# Patient Record
Sex: MALE | Race: WHITE | NOT HISPANIC OR LATINO | Employment: FULL TIME | URBAN - METROPOLITAN AREA
[De-identification: names, ages, dates, MRNs, and addresses within clinical notes are randomized per-mention and may not be internally consistent; named-entity substitution may affect disease eponyms.]

---
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
## 2018-08-09 ENCOUNTER — HOSPITAL ENCOUNTER (EMERGENCY)
Facility: MEDICAL CENTER | Age: 18
End: 2018-08-09
Attending: EMERGENCY MEDICINE

## 2018-08-09 VITALS
BODY MASS INDEX: 17.97 KG/M2 | DIASTOLIC BLOOD PRESSURE: 71 MMHG | HEART RATE: 59 BPM | WEIGHT: 140 LBS | TEMPERATURE: 98.7 F | RESPIRATION RATE: 16 BRPM | SYSTOLIC BLOOD PRESSURE: 122 MMHG | OXYGEN SATURATION: 98 % | HEIGHT: 74 IN

## 2018-08-09 DIAGNOSIS — Z20.2 CHLAMYDIA CONTACT, TREATED: ICD-10-CM

## 2018-08-09 PROCEDURE — 700102 HCHG RX REV CODE 250 W/ 637 OVERRIDE(OP): Performed by: EMERGENCY MEDICINE

## 2018-08-09 PROCEDURE — A9270 NON-COVERED ITEM OR SERVICE: HCPCS | Performed by: EMERGENCY MEDICINE

## 2018-08-09 PROCEDURE — 99283 EMERGENCY DEPT VISIT LOW MDM: CPT

## 2018-08-09 RX ORDER — AZITHROMYCIN 250 MG/1
1000 TABLET, FILM COATED ORAL ONCE
Status: COMPLETED | OUTPATIENT
Start: 2018-08-09 | End: 2018-08-09

## 2018-08-09 RX ADMIN — AZITHROMYCIN 1000 MG: 250 TABLET, FILM COATED ORAL at 01:11

## 2018-08-09 NOTE — ED PROVIDER NOTES
"ED Provider Note    Scribed for Mercedez Hernandez M.D. by Yves Monroe. 8/9/2018, 12:33 AM.    Primary care provider: Pcp Pt States None  Means of arrival: Walk-in  History obtained from: Patient  History limited by: None    CHIEF COMPLAINT  Chief Complaint   Patient presents with   • Exposure to STD       HPI  Oz Coleman is a 18 y.o. male who presents to the Emergency Department with complaints of a possible STD. The patient presents with his girlfriend who was just diagnosed with chlamydia and was given a prescription of Azithromycin. He has had sex with her recently and he believes that he also has chlamydia. The patient is not experiencing any symptoms but he is aware that men are usually asymptomatic. Patient is not experiencing any testicular pain.     REVIEW OF SYSTEMS  Positives as above. Pertinent negatives including any testicular pain.     E.     PAST MEDICAL HISTORY   has a past medical history of Cardiac syncope.    SURGICAL HISTORY   has a past surgical history that includes other orthopedic surgery (2003).    SOCIAL HISTORY  Social History   Substance Use Topics   • Smoking status: Current Every Day Smoker     Packs/day: 0.50     Types: Cigarettes   • Smokeless tobacco: Never Used   • Alcohol use No      History   Drug Use No       FAMILY HISTORY  History reviewed. No pertinent family history.    CURRENT MEDICATIONS  Reviewed. See Encounter Summary.     ALLERGIES  Allergies   Allergen Reactions   • Fish Allergy      CRAWFISH   • Nkda [No Known Drug Allergy]    • Other Environmental      environment   • Other Food      Sea food   • Peanut-Derived        PHYSICAL EXAM  VITAL SIGNS: /71   Pulse (!) 59   Temp 37.1 °C (98.7 °F)   Resp 16   Ht 1.88 m (6' 2\")   Wt 63.5 kg (140 lb)   SpO2 98%   BMI 17.97 kg/m²    Pulse ox interpretation: I interpret this pulse ox as normal.  Constitutional: Alert in no apparent distress.  HENT: Normocephalic, Atraumatic, MMM  Eyes: PERR. Conjunctiva " normal, non-icteric.   Heart: Normal peripheral perfusion.    Lungs: Symmetrical expansion. No resp distress  Abdomen: Non-tender, non-distended, normal bowel sounds  Skin: Warm, Dry, No erythema, No rash.   Neurologic: Alert and oriented, Grossly non-focal.     DIFFERENTIAL DIAGNOSIS AND WORK UP PLAN    12:33 AM Patient seen and examined at bedside. The patient presents with exposure to STD and the differential diagnosis includes but is not limited to chlamydia -treated the patient's girlfriend this evening as well, the patient has no symptoms no testicular pain low concern for epididymitis at this time. Patient will be treated with 1000 mg Zithromax tablet     The patient will return for new or worsening symptoms and is stable at the time of discharge.    DISPOSITION:  Patient will be discharged home in stable condition.    FOLLOW UP:  [facility], Emergency Dept  [address] [place] [id number] [phone]    If symptoms worsen    FINAL IMPRESSION  1. Chlamydia contact, treated          IYves (Scribe), am scribing for, and in the presence of, Mercedez Hernandez M.D..    Electronically signed by: Yves Monroe (Scribe), 8/9/2018    IMercedez M.D. personally performed the services described in this documentation, as scribed by Yves Monroe in my presence, and it is both accurate and complete.    The note accurately reflects work and decisions made by me.  Mercedez Hernandez  8/9/2018  3:48 AM      This dictation has been created using voice recognition software and/or scribes. The accuracy of the dictation is limited by the abilities of the software and the expertise of the scribes. I expect there may be some errors of grammar and possibly content. I made every attempt to manually correct the errors within my dictation. However, errors related to voice recognition software and/or scribes may still exist and should be interpreted within the appropriate  context.

## 2018-10-15 ENCOUNTER — HOSPITAL ENCOUNTER (EMERGENCY)
Facility: MEDICAL CENTER | Age: 18
End: 2018-10-15
Attending: EMERGENCY MEDICINE

## 2018-10-15 VITALS
OXYGEN SATURATION: 100 % | SYSTOLIC BLOOD PRESSURE: 128 MMHG | TEMPERATURE: 98.6 F | RESPIRATION RATE: 14 BRPM | DIASTOLIC BLOOD PRESSURE: 72 MMHG | WEIGHT: 141.09 LBS | BODY MASS INDEX: 18.11 KG/M2 | HEIGHT: 74 IN | HEART RATE: 75 BPM

## 2018-10-15 DIAGNOSIS — R59.1 LYMPHADENOPATHY: Primary | ICD-10-CM

## 2018-10-15 PROCEDURE — 99281 EMR DPT VST MAYX REQ PHY/QHP: CPT

## 2018-10-15 ASSESSMENT — PAIN SCALES - GENERAL: PAINLEVEL_OUTOF10: 2

## 2018-10-15 NOTE — ED PROVIDER NOTES
"ED Provider Note    CHIEF COMPLAINT  Chief Complaint   Patient presents with   • Lump     had it for a while under left chin. woke up today w/pain and feels movable.       HPI  Oz Coleman is a 18 y.o. male who ambulates to the emergency department with significant other complaining of a \"lump under my chin.\"  Patient states he lump was palpable last week but became more tender overnight.  Sore throat a few days ago however this is resolved.  No nasal congestion or rhinorrhea.  No ear pain.  No headache, neck pain or stiffness.  No fever chills.    REVIEW OF SYSTEMS  See HPI for further details.     PAST MEDICAL HISTORY   has a past medical history of Cardiac syncope.    SOCIAL HISTORY  Social History     Social History Main Topics   • Smoking status: Current Every Day Smoker     Packs/day: 0.50     Types: Cigarettes   • Smokeless tobacco: Never Used   • Alcohol use No   • Drug use: No   • Sexual activity: Not on file       SURGICAL HISTORY   has a past surgical history that includes other orthopedic surgery (2003).    CURRENT MEDICATIONS  Home Medications    **Home medications have not yet been reviewed for this encounter**         ALLERGIES  Allergies   Allergen Reactions   • Fish Allergy      CRAWFISH   • Nkda [No Known Drug Allergy]    • Other Environmental      environment   • Other Food      Sea food   • Peanut-Derived        PHYSICAL EXAM  VITAL SIGNS: /72   Pulse 75   Temp 37 °C (98.6 °F)   Resp 14   Ht 1.88 m (6' 2.02\")   Wt 64 kg (141 lb 1.5 oz)   SpO2 100%   BMI 18.11 kg/m²   Pulse ox interpretation: I interpret this pulse ox as normal.  Constitutional: Alert in no apparent distress.  HENT: Normocephalic, atraumatic. Bilateral external ears normal, right TM unremarkable.  Left TM mildly erythematous although with good light reflex without evidence for effusion.  No canal laceration or exudates.  Nose normal. Moist mucous membranes.  Oropharynx within normal limits, no erythema, " edema or exudate.  Uvula midline.  Eyes: Pupils are equal and reactive, Conjunctiva normal.   Neck: Normal range of motion, Supple.  No meningeal irritation.  No stridor or dysphonia.  Lymphatic: Subtle bilateral submandibular lymphadenopathy, tender with palpation on the right without overlying erythema, or induration or fluctuance.  Cardiovascular: Regular rate and rhythm, no murmurs. Distal pulses intact.  Thorax & Lungs: Normal breath sounds.  No wheezing/rales/ronchi. No increased work of breathing  Skin: Warm, Dry  Musculoskeletal: Good range of motion in all major joints.   Neurologic: Alert and oriented x4.  Ambulates independently.  Psychiatric: Affect normal, Judgment normal, Mood normal.       COURSE & MEDICAL DECISION MAKING  ED evaluation was consistent with submandibular lymphadenopathy, there is a tender node although remains small, firm but mobile  in the left submandibular region.  Suspect secondary to viral illness.  No evidence for overlying cellulitis, no evidence for abscess.  No clinical evidence for otitis media, pharyngitis, meningitis or pneumonia.  Vital signs are stable without fever tachycardia.    Patient is stable for discharge at this time, anticipatory guidance provided, Tylenol or ibuprofen for discomfort, close follow-up is encouraged for reevaluation documentation of resolution of this mildly enlarged but tender node, and strict ED return instructions have been detailed. Patient is agreeable to the disposition and plan.    Patient's blood pressure was elevated in the emergency department, and has been referred to primary care for close monitoring.      FINAL IMPRESSION  (R59.1) Lymphadenopathy  (primary encounter diagnosis)      Electronically signed by: Mercedez Maher, 10/15/2018 1:30 PM      This dictation was created using voice recognition software. The accuracy of the dictation is limited to the abilities of the software. I expect there may be some errors of grammar and  possibly content. The nursing notes were reviewed and certain aspects of this information were incorporated into this note.

## 2018-10-15 NOTE — DISCHARGE INSTRUCTIONS
Follow-up with primary care 1-2 days for reevaluation, to establish care, for medication management and close blood pressure monitoring.    Tylenol or ibuprofen as needed for discomfort.    Return to the emergency department for persistent or worsening pain, enlarging lump, difficulty swallowing or breathing, sore throat, fever, vomiting or other new concerns.

## 2018-10-15 NOTE — ED TRIAGE NOTES
Chief Complaint   Patient presents with   • Lump     had it for a while under left chin. woke up today w/pain and feels movable.   Denies difficulty swallowing/no difficulty breathing. NAD. VSS.  Educated on triage process. Instructed to notify staff for any worsening symptoms.

## 2019-03-06 ENCOUNTER — APPOINTMENT (OUTPATIENT)
Dept: RADIOLOGY | Facility: MEDICAL CENTER | Age: 19
End: 2019-03-06
Attending: EMERGENCY MEDICINE
Payer: MEDICAID

## 2019-03-06 ENCOUNTER — HOSPITAL ENCOUNTER (EMERGENCY)
Facility: MEDICAL CENTER | Age: 19
End: 2019-03-06
Attending: EMERGENCY MEDICINE
Payer: MEDICAID

## 2019-03-06 VITALS
DIASTOLIC BLOOD PRESSURE: 69 MMHG | HEART RATE: 89 BPM | TEMPERATURE: 97.9 F | HEIGHT: 74 IN | BODY MASS INDEX: 18.1 KG/M2 | OXYGEN SATURATION: 97 % | SYSTOLIC BLOOD PRESSURE: 131 MMHG | RESPIRATION RATE: 18 BRPM | WEIGHT: 141 LBS

## 2019-03-06 DIAGNOSIS — S93.401A SPRAIN OF RIGHT ANKLE, UNSPECIFIED LIGAMENT, INITIAL ENCOUNTER: ICD-10-CM

## 2019-03-06 PROCEDURE — 73610 X-RAY EXAM OF ANKLE: CPT | Mod: RT

## 2019-03-06 PROCEDURE — 99283 EMERGENCY DEPT VISIT LOW MDM: CPT

## 2019-03-06 NOTE — DISCHARGE INSTRUCTIONS
Continue with Motrin/ibuprofen as needed for pain and swelling    Ice, rest, elevate the right ankle    Use crutches    Follow-up with Dr. Giron from family medicine.  She is on call and will see you for recheck if needed for persistent ankle pain over the next 7-10 days    Return to the ER for worsening pain, swelling, numbness, or other concerns

## 2019-03-06 NOTE — ED NOTES
Pt received discharge instructions and understood all including follow up.pt ambulated to lobby with own crutch with no difficulty

## 2019-03-06 NOTE — ED PROVIDER NOTES
"ED Provider Note    Scribed for Letitia Palafox M.D. by Elbert Santiago. 3/6/2019, 8:45 AM.    Primary care provider: Pcp Pt States None  Means of arrival: walk in  History obtained from: patient  History limited by: none    CHIEF COMPLAINT  Chief Complaint   Patient presents with   • Ankle Pain     R ankle, heard a \"pop\" after moving furniture last night       HPI  Oz Coleman is a 18 y.o. male who presents to the Emergency Department complaining of gradually worsening right ankle pain starting last night. Patient reports associated ankle swelling, bruising. He states that he was moving his bed and as the heavy headboard weighing approximately 30 pounds fell he attempted to catch it with his right foot, twisting his ankle. Patient denies numbness, focal weakness, head/neck/a back injury or pain.    REVIEW OF SYSTEMS  Pertinent positives include ankle pain, swelling, bruising. Pertinent negatives include no numbness, focal weakness. See HPI for further details.      PAST MEDICAL HISTORY   has a past medical history of Cardiac syncope.    SURGICAL HISTORY   has a past surgical history that includes other orthopedic surgery (2003).    SOCIAL HISTORY  Social History   Substance Use Topics   • Smoking status: Current Every Day Smoker     Packs/day: 0.50     Types: Cigarettes   • Smokeless tobacco: Never Used   • Alcohol use No      History   Drug Use No       FAMILY HISTORY  None noted    CURRENT MEDICATIONS  Home Medications     Reviewed by Radha Thibodeaux R.N. (Registered Nurse) on 03/06/19 at 0756  Med List Status: <None>   Medication Last Dose Status        Patient Miguel A Taking any Medications                       ALLERGIES  Allergies   Allergen Reactions   • Fish Allergy      CRAWFISH   • Nkda [No Known Drug Allergy]    • Other Environmental      environment   • Other Food      Sea food   • Peanut-Derived        PHYSICAL EXAM  VITAL SIGNS: /72   Pulse (!) 103   Temp 36.6 °C (97.9 °F) " "(Temporal)   Resp 16   Ht 1.88 m (6' 2\")   Wt 64 kg (141 lb)   SpO2 97%   BMI 18.10 kg/m²     General: WDWN, nontoxic appearing in NAD; A+Ox3; V/S as above   Skin: warm and dry; good color; acneform rash over the face  HEENT: NCAT; EOMs intact; PERRL; no scleral icterus   Neck: FROM; soft  Extremities: SABA x 4; generalized edema over the right lateral malleolus with moderate ecchymosis and tenderness over the pre malleolar region. No lower leg bony tenderness. Full range of motion at the knee and ankle. Distal sensation intact. DP pulses 2+  Neurologic: CNs III-XII grossly intact; speech clear; distal sensation intact; strength 5/5 UE/LEs;   Psychiatric: Appropriate affect, normal mood                                                           DIAGNOSTIC STUDIES / PROCEDURES    RADIOLOGY  DX-ANKLE 3+ VIEWS RIGHT   Final Result      No acute osseous abnormality.      The radiologist's interpretation of all radiological studies have been reviewed by me.    COURSE & MEDICAL DECISION MAKING  Pertinent Labs & Imaging studies reviewed. (See chart for details)    Oz Coleman is a 18 y.o. male who presents complaining of right ankle pain following a contusion and rolling injury.  Patient is neurovascularly intact.  X-ray was obtained and negative for obvious ankle fracture.  I do not feel he requires a separate foot or lower leg x-ray at this time.    8:45 AM - Patient seen and examined at bedside. Ordered DX ankle to evaluate his symptoms. Patient presents today signs and symptoms of suspected ankle pain. They were evaluated in the emergency room for rule out of occult fractures. Radiology did not reveal any occult fractures or acute findings that would warrant further diagnostics. At this time, the patient is a good outpatient treatment candidate. They understand that their pain will likely increase in the coming days before improving.  I recommended ice, elevation, rest.  We will place him in an air splint. "  I offered crutches but he declined them.  Tylenol and ibuprofen are recommended for pain and swelling control. He declines any pain control interventions in the ED. Patient is instructed to return with any new or worsening symptoms, specifically if the develops distal numbness, focal weakness. He is instructed to follow up with a primary care provider if his pain does not improve in 5-7 days.  He may require repeat imaging given that some fractures are not always seen on the initial radiographic study.  He will be provided with follow up win formation.      The patient will return for new or worsening symptoms and is stable at the time of discharge.    The patient is referred to a primary physician for blood pressure management, diabetic screening, and for all other preventative health concerns.    DISPOSITION:  Patient will be discharged home in stable condition.    FOLLOW UP:  [facility], Emergency Dept  [address] [facility] [address] [phone]    As needed, If symptoms worsen    Surekha Giron M.D.  [address] [place] [address] [id number]  [id number]  [facility] [address]  [phone]    Schedule an appointment as soon as possible for a visit in 1 week  As needed for continued pain    FINAL IMPRESSION  1. Sprain of right ankle, unspecified ligament, initial encounter          Elbert DWYER (Scribe), am scribing for, and in the presence of, Letitia Palafox M.D..    Electronically signed by: Elbert Santiago (Tricia), 3/6/2019    Letitia DWYER M.D. personally performed the services described in this documentation, as scribed by Elbert Santiago in my presence, and it is both accurate and complete. E    The note accurately reflects work and decisions made by me.  Letitia Palafox  3/7/2019  12:47 PM

## 2019-03-06 NOTE — ED TRIAGE NOTES
"Chief Complaint   Patient presents with   • Ankle Pain     R ankle, heard a \"pop\" after moving furniture last night     Ambulatory with crutches. Reports swelling/bruising to ankle.     /72   Pulse (!) 103   Temp 36.6 °C (97.9 °F) (Temporal)   Resp 16   Ht 1.88 m (6' 2\")   Wt 64 kg (141 lb)   SpO2 97%   BMI 18.10 kg/m²     Pt Informed regarding triage process and verbalized understanding to inform triage tech or RN for any changes in condition.  Placed in lobby.    "

## 2019-09-06 ENCOUNTER — HOSPITAL ENCOUNTER (EMERGENCY)
Facility: MEDICAL CENTER | Age: 19
End: 2019-09-06
Attending: EMERGENCY MEDICINE
Payer: MEDICAID

## 2019-09-06 VITALS
DIASTOLIC BLOOD PRESSURE: 70 MMHG | TEMPERATURE: 98.6 F | BODY MASS INDEX: 18.14 KG/M2 | OXYGEN SATURATION: 98 % | WEIGHT: 141.31 LBS | HEIGHT: 74 IN | HEART RATE: 90 BPM | SYSTOLIC BLOOD PRESSURE: 130 MMHG | RESPIRATION RATE: 20 BRPM

## 2019-09-06 DIAGNOSIS — K08.89 PAIN, DENTAL: ICD-10-CM

## 2019-09-06 PROCEDURE — 99283 EMERGENCY DEPT VISIT LOW MDM: CPT

## 2019-09-06 RX ORDER — AMOXICILLIN 875 MG/1
875 TABLET, COATED ORAL 2 TIMES DAILY
Qty: 20 TAB | Refills: 0 | Status: SHIPPED | OUTPATIENT
Start: 2019-09-06 | End: 2019-09-16

## 2019-09-07 NOTE — ED TRIAGE NOTES
"Chief Complaint   Patient presents with   • Dental Pain     Patient ambulatory to triage, states he thinks he has a dental abcess and is experiencing dental pain. Hoping to get some abx.    Denies any difficulty clearing secretions, airway patent, no obvious facial swelling   /70   Pulse 90   Temp 37 °C (98.6 °F) (Temporal)   Resp 20   Ht 1.88 m (6' 2\")   Wt 64.1 kg (141 lb 5 oz)   SpO2 98%     Patient placed back in ed lobby, educated on ed triage process, instructed to notify staff of any new or worsening symptoms.   "

## 2019-09-07 NOTE — ED PROVIDER NOTES
ED Provider Note    Scribed for Azam Lr D.O. by Kimmy Solares. 9/6/2019  10:35 PM    Primary care provider: Pcp Pt States None  Means of arrival: Walk in  History obtained from: Patient  History limited by: None     CHIEF COMPLAINT  Chief Complaint   Patient presents with   • Dental Pain     Patient ambulatory to triage, states he thinks he has a dental abcess and is experiencing dental pain. Hoping to get some abx.        HPI  Oz Coleman is a 19 y.o. male who presents to the Emergency Department for dental pain onset 2 weeks ago. He states the pain is becoming progressively worse and he is having difficulty eating. He went to look at the area and noticed a small abscess today and it began to drain a white fluid. The patient denies fever, chills, or dysphagia. The patient does not currently have a regular dentist that he sees. There are no alleviating or exacerbating factors noted. No known drug allergies.    REVIEW OF SYSTEMS  Pertinent positives include: dental pain, dental abscess   Pertinent negatives include: fever, chills, or dysphagia   See HPI for further details.       ALLERGIES  Allergies   Allergen Reactions   • Fish Allergy      CRAWFISH   • Nkda [No Known Drug Allergy]    • Other Environmental      environment   • Other Food      Sea food   • Peanut-Derived      CURRENT MEDICATIONS  Home Medications     Reviewed by Ursula Ibrahim R.N. (Registered Nurse) on 09/06/19 at 2220  Med List Status: <None>   Medication Last Dose Status        Patient Miguel A Taking any Medications                     PAST MEDICAL HISTORY   has a past medical history of Cardiac syncope.    SURGICAL HISTORY   has a past surgical history that includes other orthopedic surgery (2003).    SOCIAL HISTORY  Social History     Tobacco Use   • Smoking status: Current Every Day Smoker     Packs/day: 0.50     Types: Cigarettes   • Smokeless tobacco: Never Used   Substance Use Topics   • Alcohol use: No   • Drug use: No  "     Social History     Substance and Sexual Activity   Drug Use No       FAMILY HISTORY  None noted.    PHYSICAL EXAM  VITAL SIGNS: /70   Pulse 90   Temp 37 °C (98.6 °F) (Temporal)   Resp 20   Ht 1.88 m (6' 2\")   Wt 64.1 kg (141 lb 5 oz)   SpO2 98%   BMI 18.14 kg/m²   Constitutional: Well-nourished, Well developed. In mild distress.   Head: Normocephalic, Atraumatic  Eyes: PERRL, sclera anicteric, EOMI, no lid swelling  ENT: Poor dentition with multiple dental carries. Erythema of the gingiva in the area of the upper incisors. There is no palpable abscess the rest of the soft palette and hard palette are normal. No nasal discharge or epistaxis. No facial deformity. Mucous membranes are moist.   Lungs: No respiratory distress.  Psychiatric: Cooperative. Appropriate mood and affect.       COURSE & MEDICAL DECISION MAKING:  Nursing notes, VS, PMSFHx reviewed in chart.     10:35 PM - Patient seen and examined at bedside. I informed the patient that his exam is reassuring. The abscess is draining at this time and he will no require further intervention. I discussed my plans for discharge with a prescription for Amoxil. The patient was advised to follow up with a dentist. He is understanding and agreeable to discharge. His vital signs prior to discharge are: /70   Pulse 90   Temp 37 °C (98.6 °F) (Temporal)   Resp 20   Ht 1.88 m (6' 2\")   Wt 64.1 kg (141 lb 5 oz)   SpO2 98%   BMI 18.14 kg/m²       FINAL IMPRESSION:  1. Pain, dental          The patient will return for new or worsening symptoms and is stable at the time of discharge.    The patient is referred to a primary physician for blood pressure management, diabetic screening, and for all other preventative health concerns.      DISPOSITION:  Patient will be discharged home in stable condition.    FOLLOW UP:  [facility], Emergency Dept  [address] [facility] [address] [phone]    If symptoms " worsen      OUTPATIENT MEDICATIONS:  New Prescriptions    AMOXICILLIN (AMOXIL) 875 MG TABLET    Take 1 Tab by mouth 2 times a day for 10 days.         I, Kimmy Solares (Scribe), am scribing for, and in the presence of, Azam Lr D.O..    Electronically signed by: Kimmy Solares (Scribe), 9/6/2019    I, Azam Lr D.O. personally performed the services described in this documentation, as scribed by Kimmy Solares in my presence, and it is both accurate and complete. E      Please note that this dictation was created using voice recognition software. I have worked with consultants from the vendor as well as technical experts from [facility] to optimize the interface. I have made every reasonable attempt to correct obvious errors, but I expect that there are errors of grammar and possibly content that I did not discover before finalizing the note.

## 2019-11-09 ENCOUNTER — HOSPITAL ENCOUNTER (EMERGENCY)
Facility: MEDICAL CENTER | Age: 19
End: 2019-11-10
Attending: EMERGENCY MEDICINE
Payer: MEDICAID

## 2019-11-09 ENCOUNTER — APPOINTMENT (OUTPATIENT)
Dept: RADIOLOGY | Facility: MEDICAL CENTER | Age: 19
End: 2019-11-09
Attending: EMERGENCY MEDICINE
Payer: MEDICAID

## 2019-11-09 DIAGNOSIS — R11.2 NON-INTRACTABLE VOMITING WITH NAUSEA, UNSPECIFIED VOMITING TYPE: ICD-10-CM

## 2019-11-09 DIAGNOSIS — R10.11 RIGHT UPPER QUADRANT ABDOMINAL PAIN: ICD-10-CM

## 2019-11-09 DIAGNOSIS — K52.9 GASTROENTERITIS: ICD-10-CM

## 2019-11-09 DIAGNOSIS — R10.31 RLQ ABDOMINAL PAIN: ICD-10-CM

## 2019-11-09 LAB
ALBUMIN SERPL BCP-MCNC: 5.3 G/DL (ref 3.2–4.9)
ALBUMIN/GLOB SERPL: 1.8 G/DL
ALP SERPL-CCNC: 86 U/L (ref 30–99)
ALT SERPL-CCNC: 24 U/L (ref 2–50)
ANION GAP SERPL CALC-SCNC: 17 MMOL/L (ref 0–11.9)
AST SERPL-CCNC: 26 U/L (ref 12–45)
BASOPHILS # BLD AUTO: 0.4 % (ref 0–1.8)
BASOPHILS # BLD: 0.09 K/UL (ref 0–0.12)
BILIRUB SERPL-MCNC: 1.3 MG/DL (ref 0.1–1.5)
BUN SERPL-MCNC: 21 MG/DL (ref 8–22)
CALCIUM SERPL-MCNC: 10.3 MG/DL (ref 8.5–10.5)
CHLORIDE SERPL-SCNC: 104 MMOL/L (ref 96–112)
CO2 SERPL-SCNC: 19 MMOL/L (ref 20–33)
CREAT SERPL-MCNC: 1.04 MG/DL (ref 0.5–1.4)
EOSINOPHIL # BLD AUTO: 0.14 K/UL (ref 0–0.51)
EOSINOPHIL NFR BLD: 0.6 % (ref 0–6.9)
ERYTHROCYTE [DISTWIDTH] IN BLOOD BY AUTOMATED COUNT: 41.1 FL (ref 35.9–50)
GLOBULIN SER CALC-MCNC: 2.9 G/DL (ref 1.9–3.5)
GLUCOSE SERPL-MCNC: 128 MG/DL (ref 65–99)
HCT VFR BLD AUTO: 53.7 % (ref 42–52)
HGB BLD-MCNC: 19 G/DL (ref 14–18)
IMM GRANULOCYTES # BLD AUTO: 0.13 K/UL (ref 0–0.11)
IMM GRANULOCYTES NFR BLD AUTO: 0.6 % (ref 0–0.9)
LIPASE SERPL-CCNC: 21 U/L (ref 11–82)
LYMPHOCYTES # BLD AUTO: 2.34 K/UL (ref 1–4.8)
LYMPHOCYTES NFR BLD: 10.1 % (ref 22–41)
MCH RBC QN AUTO: 31.1 PG (ref 27–33)
MCHC RBC AUTO-ENTMCNC: 35.4 G/DL (ref 33.7–35.3)
MCV RBC AUTO: 88 FL (ref 81.4–97.8)
MONOCYTES # BLD AUTO: 1.55 K/UL (ref 0–0.85)
MONOCYTES NFR BLD AUTO: 6.7 % (ref 0–13.4)
NEUTROPHILS # BLD AUTO: 18.94 K/UL (ref 1.82–7.42)
NEUTROPHILS NFR BLD: 81.6 % (ref 44–72)
NRBC # BLD AUTO: 0 K/UL
NRBC BLD-RTO: 0 /100 WBC
PLATELET # BLD AUTO: 304 K/UL (ref 164–446)
PMV BLD AUTO: 10.1 FL (ref 9–12.9)
POTASSIUM SERPL-SCNC: 3.5 MMOL/L (ref 3.6–5.5)
PROT SERPL-MCNC: 8.2 G/DL (ref 6–8.2)
RBC # BLD AUTO: 6.1 M/UL (ref 4.7–6.1)
SODIUM SERPL-SCNC: 140 MMOL/L (ref 135–145)
WBC # BLD AUTO: 23.2 K/UL (ref 4.8–10.8)

## 2019-11-09 PROCEDURE — 83690 ASSAY OF LIPASE: CPT

## 2019-11-09 PROCEDURE — 85025 COMPLETE CBC W/AUTO DIFF WBC: CPT

## 2019-11-09 PROCEDURE — 36415 COLL VENOUS BLD VENIPUNCTURE: CPT

## 2019-11-09 PROCEDURE — A9270 NON-COVERED ITEM OR SERVICE: HCPCS | Performed by: EMERGENCY MEDICINE

## 2019-11-09 PROCEDURE — 96374 THER/PROPH/DIAG INJ IV PUSH: CPT

## 2019-11-09 PROCEDURE — 76705 ECHO EXAM OF ABDOMEN: CPT

## 2019-11-09 PROCEDURE — 700102 HCHG RX REV CODE 250 W/ 637 OVERRIDE(OP): Performed by: EMERGENCY MEDICINE

## 2019-11-09 PROCEDURE — 99285 EMERGENCY DEPT VISIT HI MDM: CPT

## 2019-11-09 PROCEDURE — 700105 HCHG RX REV CODE 258: Performed by: EMERGENCY MEDICINE

## 2019-11-09 PROCEDURE — 96375 TX/PRO/DX INJ NEW DRUG ADDON: CPT

## 2019-11-09 PROCEDURE — 80053 COMPREHEN METABOLIC PANEL: CPT

## 2019-11-09 PROCEDURE — 700111 HCHG RX REV CODE 636 W/ 250 OVERRIDE (IP): Performed by: EMERGENCY MEDICINE

## 2019-11-09 RX ORDER — ONDANSETRON 2 MG/ML
4 INJECTION INTRAMUSCULAR; INTRAVENOUS ONCE
Status: COMPLETED | OUTPATIENT
Start: 2019-11-09 | End: 2019-11-09

## 2019-11-09 RX ORDER — METOCLOPRAMIDE HYDROCHLORIDE 5 MG/ML
10 INJECTION INTRAMUSCULAR; INTRAVENOUS ONCE
Status: COMPLETED | OUTPATIENT
Start: 2019-11-10 | End: 2019-11-09

## 2019-11-09 RX ORDER — SODIUM CHLORIDE 9 MG/ML
1000 INJECTION, SOLUTION INTRAVENOUS ONCE
Status: COMPLETED | OUTPATIENT
Start: 2019-11-09 | End: 2019-11-10

## 2019-11-09 RX ORDER — MORPHINE SULFATE 4 MG/ML
4 INJECTION, SOLUTION INTRAMUSCULAR; INTRAVENOUS ONCE
Status: COMPLETED | OUTPATIENT
Start: 2019-11-09 | End: 2019-11-09

## 2019-11-09 RX ORDER — FAMOTIDINE 20 MG/1
20 TABLET, FILM COATED ORAL ONCE
Status: COMPLETED | OUTPATIENT
Start: 2019-11-10 | End: 2019-11-09

## 2019-11-09 RX ADMIN — SODIUM CHLORIDE 1000 ML: 9 INJECTION, SOLUTION INTRAVENOUS at 23:35

## 2019-11-09 RX ADMIN — ONDANSETRON 4 MG: 2 INJECTION INTRAMUSCULAR; INTRAVENOUS at 23:35

## 2019-11-09 RX ADMIN — MORPHINE SULFATE 4 MG: 4 INJECTION INTRAVENOUS at 23:36

## 2019-11-09 RX ADMIN — METOCLOPRAMIDE 10 MG: 5 INJECTION, SOLUTION INTRAMUSCULAR; INTRAVENOUS at 23:51

## 2019-11-09 RX ADMIN — FAMOTIDINE 20 MG: 20 TABLET ORAL at 23:51

## 2019-11-09 ASSESSMENT — LIFESTYLE VARIABLES
HAVE PEOPLE ANNOYED YOU BY CRITICIZING YOUR DRINKING: NO
TOTAL SCORE: 0
EVER FELT BAD OR GUILTY ABOUT YOUR DRINKING: NO
HAVE YOU EVER FELT YOU SHOULD CUT DOWN ON YOUR DRINKING: NO
DOES PATIENT WANT TO STOP DRINKING: NO
CONSUMPTION TOTAL: INCOMPLETE
DO YOU DRINK ALCOHOL: NO
TOTAL SCORE: 0
EVER HAD A DRINK FIRST THING IN THE MORNING TO STEADY YOUR NERVES TO GET RID OF A HANGOVER: NO
TOTAL SCORE: 0

## 2019-11-10 ENCOUNTER — HOSPITAL ENCOUNTER (OUTPATIENT)
Dept: RADIOLOGY | Facility: MEDICAL CENTER | Age: 19
End: 2019-11-10
Attending: EMERGENCY MEDICINE

## 2019-11-10 VITALS
WEIGHT: 141.31 LBS | TEMPERATURE: 97 F | HEART RATE: 83 BPM | RESPIRATION RATE: 14 BRPM | SYSTOLIC BLOOD PRESSURE: 110 MMHG | DIASTOLIC BLOOD PRESSURE: 51 MMHG | BODY MASS INDEX: 18.14 KG/M2 | HEIGHT: 74 IN | OXYGEN SATURATION: 96 %

## 2019-11-10 PROCEDURE — 700117 HCHG RX CONTRAST REV CODE 255: Performed by: EMERGENCY MEDICINE

## 2019-11-10 PROCEDURE — 74177 CT ABD & PELVIS W/CONTRAST: CPT

## 2019-11-10 RX ORDER — ONDANSETRON 4 MG/1
4 TABLET, ORALLY DISINTEGRATING ORAL EVERY 8 HOURS PRN
Qty: 10 TAB | Refills: 0 | Status: SHIPPED | OUTPATIENT
Start: 2019-11-10 | End: 2021-02-16

## 2019-11-10 RX ADMIN — IOHEXOL 80 ML: 350 INJECTION, SOLUTION INTRAVENOUS at 00:17

## 2019-11-10 NOTE — ED PROVIDER NOTES
ED Provider Note    Scribed for Dimas Ryan M.D. by Mj Mendoza. 11/9/2019, 11:10 PM.    Primary care provider: Pcp Pt States None  Means of arrival: Walk in   History obtained from: Patient  History limited by: None    CHIEF COMPLAINT  Chief Complaint   Patient presents with   • N/V     Pt complains of n/v/d since 1800hrs this evening. Pt denies using in otc for symptom control. Pt endorses vomitting appx 3 times since being in triage.    • Abdominal Pain     Pt complaining of bilateral LQP that started appx 1800hrs       HPI  Oz Coleman is a 19 y.o. male who presents to the Emergency Department for abdominal pain, nausea and vomiting onset earlier tonight. The patient states that he was with his wife in the labor and delivery unit when he got sick. At this time the patient endorses diarrhea, but denies any fever, hematuria or hematochezia. Patient still has his gallbladder. No exacerbating or alleviating factors were stated. He is not allergic to any medication.     REVIEW OF SYSTEMS  Pertinent positives include abdominal pain, nausea, vomiting, and diarrhea. Pertinent negatives include fever, hematuria or hematochezia. All other systems negative.    PAST MEDICAL HISTORY   has a past medical history of Cardiac syncope.    SURGICAL HISTORY   has a past surgical history that includes other orthopedic surgery (2003).    SOCIAL HISTORY  Social History     Tobacco Use   • Smoking status: Current Every Day Smoker     Packs/day: 1.00     Types: Cigarettes   • Smokeless tobacco: Never Used   Substance Use Topics   • Alcohol use: No   • Drug use: Yes     Types: Inhaled     Comment: thc daily       Social History     Substance and Sexual Activity   Drug Use Yes   • Types: Inhaled    Comment: thc daily        FAMILY HISTORY  History reviewed. No pertinent family history.    CURRENT MEDICATIONS  No current facility-administered medications for this encounter.     Current Outpatient Medications:   •   "ondansetron, 4 mg, Oral, Q8HRS PRN     ALLERGIES  Allergies   Allergen Reactions   • Fish Allergy      CRAWFISH   • Nkda [No Known Drug Allergy]    • Other Environmental      environment   • Other Food      Sea food   • Peanut-Derived        PHYSICAL EXAM  VITAL SIGNS: /76   Pulse 80   Temp 36.1 °C (97 °F) (Temporal)   Resp 14   Ht 1.88 m (6' 2\")   Wt 64.1 kg (141 lb 5 oz)   SpO2 97%   BMI 18.14 kg/m²     Constitutional: Well developed, Well nourished, Moderate distress.   HENT: Poor dentition. Normocephalic, Atraumatic, Oropharynx moist.   Eyes: Conjunctiva normal, No discharge.   Cardiovascular: Normal heart rate, Normal rhythm, No murmurs, equal pulses.   Pulmonary: Normal breath sounds, No respiratory distress, No wheezing, No rales, No rhonchi.  Chest: Lungs clear. No chest wall tenderness or deformity.   Abdomen: RUQ tenderness with guarding. Questionable RLQ tenderness. Soft, No masses, no rebound.   Back: Bilateral CVA tenderness.   Musculoskeletal: No major deformities noted, No tenderness.   Skin: Warm, Dry, No erythema, No rash.   Neurologic: Alert & oriented x 3, Normal motor function,  No focal deficits noted.   Psychiatric: Affect normal, Judgment normal, Mood normal.     LABS  Results for orders placed or performed during the hospital encounter of 11/09/19   CBC WITH DIFFERENTIAL   Result Value Ref Range    WBC 23.2 (H) 4.8 - 10.8 K/uL    RBC 6.10 4.70 - 6.10 M/uL    Hemoglobin 19.0 (H) 14.0 - 18.0 g/dL    Hematocrit 53.7 (H) 42.0 - 52.0 %    MCV 88.0 81.4 - 97.8 fL    MCH 31.1 27.0 - 33.0 pg    MCHC 35.4 (H) 33.7 - 35.3 g/dL    RDW 41.1 35.9 - 50.0 fL    Platelet Count 304 164 - 446 K/uL    MPV 10.1 9.0 - 12.9 fL    Neutrophils-Polys 81.60 (H) 44.00 - 72.00 %    Lymphocytes 10.10 (L) 22.00 - 41.00 %    Monocytes 6.70 0.00 - 13.40 %    Eosinophils 0.60 0.00 - 6.90 %    Basophils 0.40 0.00 - 1.80 %    Immature Granulocytes 0.60 0.00 - 0.90 %    Nucleated RBC 0.00 /100 WBC    Neutrophils " (Absolute) 18.94 (H) 1.82 - 7.42 K/uL    Lymphs (Absolute) 2.34 1.00 - 4.80 K/uL    Monos (Absolute) 1.55 (H) 0.00 - 0.85 K/uL    Eos (Absolute) 0.14 0.00 - 0.51 K/uL    Baso (Absolute) 0.09 0.00 - 0.12 K/uL    Immature Granulocytes (abs) 0.13 (H) 0.00 - 0.11 K/uL    NRBC (Absolute) 0.00 K/uL   COMP METABOLIC PANEL   Result Value Ref Range    Sodium 140 135 - 145 mmol/L    Potassium 3.5 (L) 3.6 - 5.5 mmol/L    Chloride 104 96 - 112 mmol/L    Co2 19 (L) 20 - 33 mmol/L    Anion Gap 17.0 (H) 0.0 - 11.9    Glucose 128 (H) 65 - 99 mg/dL    Bun 21 8 - 22 mg/dL    Creatinine 1.04 0.50 - 1.40 mg/dL    Calcium 10.3 8.5 - 10.5 mg/dL    AST(SGOT) 26 12 - 45 U/L    ALT(SGPT) 24 2 - 50 U/L    Alkaline Phosphatase 86 30 - 99 U/L    Total Bilirubin 1.3 0.1 - 1.5 mg/dL    Albumin 5.3 (H) 3.2 - 4.9 g/dL    Total Protein 8.2 6.0 - 8.2 g/dL    Globulin 2.9 1.9 - 3.5 g/dL    A-G Ratio 1.8 g/dL   LIPASE   Result Value Ref Range    Lipase 21 11 - 82 U/L   ESTIMATED GFR   Result Value Ref Range    GFR If African American >60 >60 mL/min/1.73 m 2    GFR If Non African American >60 >60 mL/min/1.73 m 2      All labs reviewed by me.    RADIOLOGY  CT-ABDOMEN-PELVIS WITH   Final Result      1.  Bowel findings as noted above, likely due to gastroenteritis.   2.  No other acute abdominal or pelvic findings.      US-RUQ   Final Result      Normal findings.        The radiologist's interpretation of all radiological studies have been reviewed by me.    COURSE & MEDICAL DECISION MAKING  Pertinent Labs & Imaging studies reviewed. (See chart for details)    11:10 PM - Patient seen and examined at bedside. Patient will be treated with 1L of NS, Zofran 4 mg, and morphine 4 mg. Ordered UA, lipase, CMP, CBC with differential, and us-RUQ to evaluate his symptoms. The differential diagnoses include but are not limited to: gastroenteritis, cholecystitis, appendicitis, bowel obstruction, and sepsis    1:12 AM Patient was reevaluated at bedside. He states that  he is feeling improved at this time. Patient will be discharged at this time.     HYDRATION: Based on the patient's presentation of Acute Vomiting the patient was given IV fluids. IV Hydration was used because oral hydration was not adequate alone. Upon recheck following hydration, the patient was improved.     Medical Decision Making: At this point time I think the patient's symptoms are most likely secondary to viral gastroenteritis from possible food poisoning this morning.  Patient's vomiting is been under control now with fluids and pain meds.  Patient has significant elevated white blood cell count therefore initially patient had ultrasound gallbladder because that seemed for to be where he was tender this is related moved to the right lower quadrant therefore CT of the abdomen pelvis was done to rule out appendicitis which is negative.     The patient will return for new or worsening symptoms and is stable at the time of discharge.    DISPOSITION:  Patient will be discharged home in stable condition.    FOLLOW UP:  Your doctor    Schedule an appointment as soon as possible for a visit in 3 days      [address] [address] [phone]    If you need a doctor    [address] [address] [phone]    If you need a doctor      OUTPATIENT MEDICATIONS:  New Prescriptions    ONDANSETRON (ZOFRAN ODT) 4 MG TABLET DISPERSIBLE    Take 1 Tab by mouth every 8 hours as needed.         FINAL IMPRESSION  1. Right upper quadrant abdominal pain    2. Non-intractable vomiting with nausea, unspecified vomiting type    3. RLQ abdominal pain    4. Gastroenteritis          Mj DWYER), am scribing for, and in the presence of, Dimas Ryan M.D.    Electronically signed by: jM Tripathi), 11/9/2019    Dimas DWYER M.D. personally performed the services described in this documentation, as scribed by  Mj HOWELL Twells in my presence, and it is both accurate and complete.  C  The note accurately reflects work and decisions made by me.  Dimas Ryan  11/10/2019  5:04 AM

## 2019-11-10 NOTE — ED NOTES
Patient verbalized understanding of discharge instructions, provided with discharge paperwork, gait steady, ambulated independently to [facility].

## 2019-11-10 NOTE — ED NOTES
Patient awake alert and oriented x 4, Glasc[place] 15, bed in low position, call light within reach, on room air, attached to cardiac monitor, unlabored breathing noted, no cough noted, interacts with staff, interactions noted as appropriate, complains of nausea, friend at bedside.

## 2019-11-10 NOTE — ED NOTES
Patient resting in bed, sleeping, no signs of pain or distress, unlabored breathing noted, attached to cardiac monitor, bed in low position, call light within reach, friend at bedside.

## 2020-05-18 ENCOUNTER — HOSPITAL ENCOUNTER (EMERGENCY)
Facility: MEDICAL CENTER | Age: 20
End: 2020-05-18
Attending: EMERGENCY MEDICINE
Payer: MEDICAID

## 2020-05-18 ENCOUNTER — APPOINTMENT (OUTPATIENT)
Dept: RADIOLOGY | Facility: MEDICAL CENTER | Age: 20
End: 2020-05-18
Attending: EMERGENCY MEDICINE
Payer: MEDICAID

## 2020-05-18 VITALS
BODY MASS INDEX: 21.93 KG/M2 | WEIGHT: 170.86 LBS | TEMPERATURE: 98.8 F | RESPIRATION RATE: 14 BRPM | DIASTOLIC BLOOD PRESSURE: 57 MMHG | OXYGEN SATURATION: 97 % | HEART RATE: 64 BPM | HEIGHT: 74 IN | SYSTOLIC BLOOD PRESSURE: 115 MMHG

## 2020-05-18 DIAGNOSIS — S60.221A CONTUSION OF RIGHT HAND, INITIAL ENCOUNTER: ICD-10-CM

## 2020-05-18 PROCEDURE — 99283 EMERGENCY DEPT VISIT LOW MDM: CPT

## 2020-05-18 PROCEDURE — A9270 NON-COVERED ITEM OR SERVICE: HCPCS | Performed by: EMERGENCY MEDICINE

## 2020-05-18 PROCEDURE — 700102 HCHG RX REV CODE 250 W/ 637 OVERRIDE(OP): Performed by: EMERGENCY MEDICINE

## 2020-05-18 PROCEDURE — 73130 X-RAY EXAM OF HAND: CPT | Mod: RT

## 2020-05-18 RX ORDER — IBUPROFEN 600 MG/1
600 TABLET ORAL ONCE
Status: COMPLETED | OUTPATIENT
Start: 2020-05-18 | End: 2020-05-18

## 2020-05-18 RX ORDER — ACETAMINOPHEN 325 MG/1
650 TABLET ORAL ONCE
Status: COMPLETED | OUTPATIENT
Start: 2020-05-18 | End: 2020-05-18

## 2020-05-18 RX ORDER — IBUPROFEN 600 MG/1
600 TABLET ORAL EVERY 6 HOURS PRN
Qty: 20 TAB | Refills: 0 | Status: SHIPPED | OUTPATIENT
Start: 2020-05-18 | End: 2021-02-16

## 2020-05-18 RX ADMIN — ACETAMINOPHEN 650 MG: 325 TABLET, FILM COATED ORAL at 13:41

## 2020-05-18 RX ADMIN — IBUPROFEN 600 MG: 600 TABLET ORAL at 13:41

## 2020-05-18 ASSESSMENT — FIBROSIS 4 INDEX: FIB4 SCORE: 0.37

## 2020-05-18 ASSESSMENT — LIFESTYLE VARIABLES: DO YOU DRINK ALCOHOL: NO

## 2020-05-18 NOTE — ED NOTES
Pt ambulated to yellow 61, agreed triage note. Limited movement right fingers due to pain. 2+ radial pulse,capillary refill <3 .

## 2020-05-18 NOTE — ED NOTES
Patient resting on cart, awake, alert, oriented x 4. Patient updated on POC, verbalizes understanding. Assessment unchanged. Denies needs or questions, call light within reach, will continue to monitor. Patient aware of pending imaging.
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
## 2020-07-12 ENCOUNTER — APPOINTMENT (OUTPATIENT)
Dept: RADIOLOGY | Facility: MEDICAL CENTER | Age: 20
End: 2020-07-12
Attending: EMERGENCY MEDICINE
Payer: MEDICAID

## 2020-07-12 ENCOUNTER — HOSPITAL ENCOUNTER (EMERGENCY)
Facility: MEDICAL CENTER | Age: 20
End: 2020-07-12
Attending: EMERGENCY MEDICINE
Payer: MEDICAID

## 2020-07-12 VITALS
HEIGHT: 74 IN | SYSTOLIC BLOOD PRESSURE: 112 MMHG | HEART RATE: 59 BPM | TEMPERATURE: 97.4 F | BODY MASS INDEX: 20.97 KG/M2 | DIASTOLIC BLOOD PRESSURE: 61 MMHG | OXYGEN SATURATION: 98 % | WEIGHT: 163.36 LBS | RESPIRATION RATE: 14 BRPM

## 2020-07-12 DIAGNOSIS — S93.401A SPRAIN OF RIGHT ANKLE, UNSPECIFIED LIGAMENT, INITIAL ENCOUNTER: ICD-10-CM

## 2020-07-12 PROCEDURE — 99283 EMERGENCY DEPT VISIT LOW MDM: CPT

## 2020-07-12 PROCEDURE — 73610 X-RAY EXAM OF ANKLE: CPT | Mod: RT

## 2020-07-12 ASSESSMENT — LIFESTYLE VARIABLES
TOTAL SCORE: 0
ON A TYPICAL DAY WHEN YOU DRINK ALCOHOL HOW MANY DRINKS DO YOU HAVE: 3
DO YOU DRINK ALCOHOL: YES
HOW MANY TIMES IN THE PAST YEAR HAVE YOU HAD 5 OR MORE DRINKS IN A DAY: 0
AVERAGE NUMBER OF DAYS PER WEEK YOU HAVE A DRINK CONTAINING ALCOHOL: 2
EVER FELT BAD OR GUILTY ABOUT YOUR DRINKING: NO
TOTAL SCORE: 0
CONSUMPTION TOTAL: NEGATIVE
HAVE PEOPLE ANNOYED YOU BY CRITICIZING YOUR DRINKING: NO
HAVE YOU EVER FELT YOU SHOULD CUT DOWN ON YOUR DRINKING: NO
TOTAL SCORE: 0
DOES PATIENT WANT TO STOP DRINKING: NO
EVER HAD A DRINK FIRST THING IN THE MORNING TO STEADY YOUR NERVES TO GET RID OF A HANGOVER: NO

## 2020-07-12 ASSESSMENT — FIBROSIS 4 INDEX: FIB4 SCORE: 0.38

## 2020-07-12 NOTE — ED NOTES
Pt Given discharge instructions/  home care instructions, Pt verbalized understanding of instructions given, pt ambulatory to [facility].
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
## 2020-07-12 NOTE — ED TRIAGE NOTES
Oz Coleman  Chief Complaint   Patient presents with   • Ankle Swelling     right,  rolled ankle last night     Pt ambulatory to triage with above complaint.  VSS, no acute distress.   Swelling noted to (R) ankle after rolling it last night.  CMS intact.   Pt/staff masked and in appropriate PPE during encounter.   Pt returned to lobby, educated on triage process, and to inform staff of any changes or concerns.

## 2021-02-16 ENCOUNTER — HOSPITAL ENCOUNTER (EMERGENCY)
Facility: MEDICAL CENTER | Age: 21
End: 2021-02-16
Attending: EMERGENCY MEDICINE
Payer: MEDICAID

## 2021-02-16 ENCOUNTER — APPOINTMENT (OUTPATIENT)
Dept: RADIOLOGY | Facility: MEDICAL CENTER | Age: 21
End: 2021-02-16
Attending: EMERGENCY MEDICINE
Payer: MEDICAID

## 2021-02-16 VITALS
DIASTOLIC BLOOD PRESSURE: 88 MMHG | HEART RATE: 66 BPM | OXYGEN SATURATION: 99 % | BODY MASS INDEX: 20.2 KG/M2 | WEIGHT: 157.41 LBS | TEMPERATURE: 96.8 F | RESPIRATION RATE: 18 BRPM | SYSTOLIC BLOOD PRESSURE: 125 MMHG | HEIGHT: 74 IN

## 2021-02-16 DIAGNOSIS — S83.421A SPRAIN OF LATERAL COLLATERAL LIGAMENT OF RIGHT KNEE, INITIAL ENCOUNTER: ICD-10-CM

## 2021-02-16 DIAGNOSIS — Z87.81 HISTORY OF FRACTURE OF PATELLA: ICD-10-CM

## 2021-02-16 PROCEDURE — 73564 X-RAY EXAM KNEE 4 OR MORE: CPT | Mod: RT

## 2021-02-16 PROCEDURE — 99283 EMERGENCY DEPT VISIT LOW MDM: CPT

## 2021-02-16 ASSESSMENT — FIBROSIS 4 INDEX: FIB4 SCORE: 0.38

## 2021-02-16 ASSESSMENT — ENCOUNTER SYMPTOMS
COUGH: 0
FEVER: 0
VOMITING: 0
NAUSEA: 0
HEADACHES: 0
SHORTNESS OF BREATH: 0
FALLS: 0

## 2021-02-16 ASSESSMENT — PAIN DESCRIPTION - PAIN TYPE: TYPE: ACUTE PAIN

## 2021-02-16 NOTE — ED PROVIDER NOTES
"ED Provider Note    ED Provider Note    Primary care provider: Pcp Unknown  Means of arrival: POV   History obtained from: Patient  History limited by: None    CHIEF COMPLAINT  Chief Complaint   Patient presents with   • Knee Pain     pt reports R patellar fracture September 5, 2020 and was treated at [facility].  Pt reports bracing x 3 weeks and physical therapy and was much better until last night when he stepped partially on an uneven surface \"and it twisted and popped\"       HPI  Oz Coleman is a 20 y.o. male who presents to the Emergency Department chief complaint of right knee pain.  Patient states yesterday evening, he stepped on a piece of irregular pavement, he went first down on his heel, then his knees went forward and kind of buckled, \"like it usually does\".  But then he began experiencing pain to the lateral aspect of his right knee.  He was concerned because he suffered a patellar fracture in September of last year.  He was treated at [facility] with a brace and physical therapy but did not require surgery.  He had been doing well until this episode.  He felt his knee twist and pop but he cannot give me details about it, stating that it happened rapidly.  He denies this event causing him to fall.  He did not hit his head.  He does not have neck pain.  He did not go out on an outstretched hand or injure his upper extremities.  He denies any recent cough or cold symptoms.  No GI symptoms.  He is otherwise been in his normal state of health.    REVIEW OF SYSTEMS  Review of Systems   Constitutional: Negative for fever.   HENT: Negative for congestion.    Respiratory: Negative for cough and shortness of breath.    Gastrointestinal: Negative for nausea and vomiting.   Musculoskeletal: Positive for joint pain. Negative for falls.        Right knee   Neurological: Negative for headaches.   All other systems reviewed and are negative.      PAST MEDICAL HISTORY   has a past " "medical history of Cardiac syncope.    SURGICAL HISTORY   has a past surgical history that includes other orthopedic surgery (2003).    SOCIAL HISTORY  Social History     Tobacco Use   • Smoking status: Current Every Day Smoker     Packs/day: 1.00     Types: Cigarettes   • Smokeless tobacco: Never Used   Substance Use Topics   • Alcohol use: Yes     Comment: occasionally   • Drug use: Not Currently     Types: Inhaled      Social History     Substance and Sexual Activity   Drug Use Not Currently   • Types: Inhaled       FAMILY HISTORY  History reviewed. No pertinent family history.    CURRENT MEDICATIONS  Home Medications     Reviewed by Sarahi Perry R.N. (Registered Nurse) on 02/16/21 at 0750  Med List Status: Complete   Medication Last Dose Status        Patient Miguel A Taking any Medications                       ALLERGIES  Allergies   Allergen Reactions   • Fish Allergy      CRAWFISH   • Nkda [No Known Drug Allergy]    • Other Environmental      Environment, \"everything that grows\"   • Other Food      Sea food   • Peanut-Derived        PHYSICAL EXAM  VITAL SIGNS: /88   Pulse 67   Temp 36 °C (96.8 °F) (Temporal)   Resp 14   Ht 1.88 m (6' 2\")   Wt 71.4 kg (157 lb 6.5 oz)   SpO2 99%   BMI 20.21 kg/m²   Vitals reviewed.  Constitutional: Patient is oriented to person, place, and time. Appears well-developed and well-nourished. No distress.    Head: Normocephalic and atraumatic.   Mouth/Throat: Mask in place  Eyes: Conjunctivae are normal.  Neck: Normal range of motion.   Cardiovascular: Normal rate, regular rhythm and normal heart sounds. Normal peripheral pulses, bilateral LE.  Pulmonary/Chest: Effort normal and breath sounds normal. No respiratory distress, no wheezes  Musculoskeletal: No edema and no tenderness, except to the lateral aspect of the right knee.  No laxity noted on anterior drawer test or medial or lateral stress.  No significant swelling to the knee or lower extremity, when compared to " the contralateral side.  There are no traumatic changes, bruising, cellulitic changes or ecchymosis.   Neurological: No focal deficits.   Skin: Skin is warm and dry. No erythema. No pallor.   Psychiatric: Patient has a normal mood and affect.     RADIOLOGY  DX-KNEE COMPLETE 4+ RIGHT   Final Result      1.  Evidence of old healed patellar fracture.   2.  No acute fracture or dislocation of RIGHT knee.        The radiologist's interpretation of all radiological studies have been reviewed by me.    COURSE & MEDICAL DECISION MAKING  Pertinent Labs & Imaging studies reviewed. (See chart for details)    Obtained and reviewed past medical records.  Patient's last ED encounter was in July of last year he was seen for ankle swelling and ankle sprain.  Patient seen in the emergency department in May of last year, diagnosed with a contusion of his right hand.  Patient has 3 ED visits in 2019, in November, patient was diagnosed with right upper quadrant abdominal pain, nausea vomiting and gastroenteritis.  September 2019, seen for dental pain.  And another visit in March 2019 for ankle sprain.    8:05 AM - Patient seen and examined at bedside.  Patient presents with recent twisting injury without fall, to his right knee after having a recent injury last fall.  I discussed with the patient, I doubt forces involved are adequate enough to cause a fracture however, in light of him recently having a patellar fracture, I think an x-ray is worthwhile.  He has no clinical symptoms to suggest vascular injury.  We also discussed the possibility of a soft tissue injury including a ligamentous or meniscal injury.  I see no need for emergent MRI.  If pain persists in light of a negative x-ray, I have advised him to follow-up with [facility] orth[facility].  He already has a brace at home.    8:55 AM patient is reevaluated at the bedside.  Reviewed strays together.  He does have continued evidence of a healing patellar fracture.  No other  abnormalities noted on knee x-ray.  I suspect, this is likely a sprain however, he is advised, if he has persistent symptoms to follow-up with [facility] and consider outpatient MRI.  In the meantime, he would like to use his own knee brace that he previously was given and order a an Ace wrap which he also has at home.  We discussed taking Tylenol or ibuprofen.  Again at this point, I see no indication for further imaging, I have no concern for vascular injury.  Patient and mother, given an opportunity for questions.  He is well-appearing and nontoxic.  He will be discharged home in stable condition.    FINAL IMPRESSION  1. Sprain of lateral collateral ligament of right knee, initial encounter    2. History of fracture of patella

## 2021-02-16 NOTE — ED TRIAGE NOTES
"Oz Coleman 20 y.o. male ambulatory to triage with spouse for     Chief Complaint   Patient presents with   • Knee Pain     pt reports R patellar fracture September 5, 2020 and was treated at [facility].  Pt reports bracing x 3 weeks and physical therapy and was much better until last night when he stepped partially on an uneven surface \"and it twisted and popped\"     Pt ambulatory with slight limp.  Pt reports \"some\" numbness to R foot since surgery.   /88   Pulse 67   Temp 36 °C (96.8 °F) (Temporal)   Resp 14   Ht 1.88 m (6' 2\")   Wt 71.4 kg (157 lb 6.5 oz)   SpO2 99%   BMI 20.21 kg/m²   Pt returned to the lobby to await bed assignment.  Advised to return to the triage desk for any changes/concerns.  "

## 2021-03-17 ENCOUNTER — HOSPITAL ENCOUNTER (EMERGENCY)
Facility: MEDICAL CENTER | Age: 21
End: 2021-03-17
Attending: EMERGENCY MEDICINE
Payer: MEDICAID

## 2021-03-17 ENCOUNTER — APPOINTMENT (OUTPATIENT)
Dept: RADIOLOGY | Facility: MEDICAL CENTER | Age: 21
End: 2021-03-17
Attending: EMERGENCY MEDICINE
Payer: MEDICAID

## 2021-03-17 VITALS
RESPIRATION RATE: 16 BRPM | WEIGHT: 160.27 LBS | BODY MASS INDEX: 20.57 KG/M2 | SYSTOLIC BLOOD PRESSURE: 119 MMHG | HEART RATE: 69 BPM | TEMPERATURE: 96.5 F | DIASTOLIC BLOOD PRESSURE: 57 MMHG | OXYGEN SATURATION: 96 % | HEIGHT: 74 IN

## 2021-03-17 DIAGNOSIS — S60.221A CONTUSION OF RIGHT HAND, INITIAL ENCOUNTER: ICD-10-CM

## 2021-03-17 PROCEDURE — 73120 X-RAY EXAM OF HAND: CPT | Mod: RT

## 2021-03-17 PROCEDURE — 99283 EMERGENCY DEPT VISIT LOW MDM: CPT

## 2021-03-17 ASSESSMENT — FIBROSIS 4 INDEX: FIB4 SCORE: 0.38

## 2021-03-17 NOTE — ED TRIAGE NOTES
"Chief Complaint   Patient presents with   • Hand Pain     Pt punched the door last night and is now having pain in his middle finger. Pt has swelling and redness to the middle finger.      /84   Pulse 94   Temp 35.8 °C (96.5 °F) (Temporal)   Resp 16   Ht 1.88 m (6' 2\")   Wt 72.7 kg (160 lb 4.4 oz)   SpO2 99%   BMI 20.58 kg/m²     Patient arrived to ED for the above complaint. Triage process explained to patient. Patient placed in lobby and told to notify staff of any changes.   "

## 2021-03-17 NOTE — ED PROVIDER NOTES
"ED Provider Note    CHIEF COMPLAINT  Chief Complaint   Patient presents with   • Hand Pain     Pt punched the door last night and is now having pain in his middle finger. Pt has swelling and redness to the middle finger.        HPI  Oz Coleman is a 20 y.o. male who presents with right hand pain.  The patient states he punched a door last night and since then he has had pain to the right hand.  He states the pain is located mostly in the right third distal metacarpal and third phalanx at the PIP joint.  He does have limited flexion and extension at the third MCP and PIP joint due to pain.  Otherwise he does not have any loss of function of the hand.  Does not have any wrist pain.    REVIEW OF SYSTEMS  No recent fevers, no other musculoskeletal complaints    PHYSICAL EXAM  VITAL SIGNS: /84   Pulse 94   Temp 35.8 °C (96.5 °F) (Temporal)   Resp 16   Ht 1.88 m (6' 2\")   Wt 72.7 kg (160 lb 4.4 oz)   SpO2 99%   BMI 20.58 kg/m²   In general the patient does not appear toxic    Extremities the patient has tenderness to the distal third metacarpal as well as the proximal phalanx and PIP joint of the left third phalanx.  He does have some abrasions to the dorsal aspect of the right hand.    Skin the abrasions described above    Neurovascular examination is intact to the right hand    RADIOLOGY/PROCEDURES  DX-HAND 2- RIGHT   Final Result      Old post traumatic change involving the fifth metacarpal. No evidence of acute fracture.                  COURSE & MEDICAL DECISION MAKING  Pertinent Labs & Imaging studies reviewed. (See chart for details)  This a 20-year-old male who presents the emerge department with a contusion to the right hand.  X-rays do not support a fracture.  The patient will utilize ice and anti-inflammatories.  If he is not asymptomatic in 1 week he will return for repeat examination for possible occult fracture.    FINAL IMPRESSION  1.  Right dorsal hand contusion and " abrasions    Disposition  The patient will be discharged in stable condition      Electronically signed by: Mk Scott M.D., 3/17/2021 8:13 AM

## 2021-03-17 NOTE — ED NOTES
Discussed DC with patient, patient verbalized understanding regarding follow up. Patient A&Ox4 prior to DC

## 2021-04-13 ENCOUNTER — HOSPITAL ENCOUNTER (EMERGENCY)
Facility: MEDICAL CENTER | Age: 21
End: 2021-04-13
Attending: EMERGENCY MEDICINE
Payer: MEDICAID

## 2021-04-13 ENCOUNTER — APPOINTMENT (OUTPATIENT)
Dept: RADIOLOGY | Facility: MEDICAL CENTER | Age: 21
End: 2021-04-13
Attending: EMERGENCY MEDICINE
Payer: MEDICAID

## 2021-04-13 VITALS
WEIGHT: 157.85 LBS | DIASTOLIC BLOOD PRESSURE: 80 MMHG | TEMPERATURE: 97.7 F | OXYGEN SATURATION: 98 % | RESPIRATION RATE: 16 BRPM | SYSTOLIC BLOOD PRESSURE: 133 MMHG | HEART RATE: 75 BPM | BODY MASS INDEX: 20.26 KG/M2 | HEIGHT: 74 IN

## 2021-04-13 DIAGNOSIS — S63.502A SPRAIN OF LEFT WRIST, INITIAL ENCOUNTER: ICD-10-CM

## 2021-04-13 PROCEDURE — 99283 EMERGENCY DEPT VISIT LOW MDM: CPT

## 2021-04-13 PROCEDURE — 73110 X-RAY EXAM OF WRIST: CPT | Mod: LT

## 2021-04-13 ASSESSMENT — FIBROSIS 4 INDEX: FIB4 SCORE: 0.38

## 2021-04-13 NOTE — ED PROVIDER NOTES
"CHIEF COMPLAINT  Chief Complaint   Patient presents with   • Wrist Pain       HPI  Oz Coleman is a 20 y.o. male who presents to the emergency room with left wrist pain.  He was planting a tree yesterday when the tree slipped and he extended his left wrist.  Immediate pain.  Constant nonradiating associated swelling over the wrist.  Worse with any sort of movement.  No lacerations or abrasions.    REVIEW OF SYSTEMS  As per HPI, otherwise a 10 point review of systems is negative    PAST MEDICAL HISTORY  Past Medical History:   Diagnosis Date   • Cardiac syncope        SOCIAL HISTORY  Social History     Tobacco Use   • Smoking status: Current Every Day Smoker     Packs/day: 1.00     Types: Cigarettes   • Smokeless tobacco: Never Used   Substance Use Topics   • Alcohol use: Yes     Comment: occasionally   • Drug use: Not Currently     Types: Inhaled       SURGICAL HISTORY  Past Surgical History:   Procedure Laterality Date   • OTHER ORTHOPEDIC SURGERY  2003    broken arm       CURRENT MEDICATIONS  Home Medications     Reviewed by Noemi Ocampo R.N. (Registered Nurse) on 04/13/21 at 0723  Med List Status: Not Addressed   Medication Last Dose Status        Patient Miguel A Taking any Medications                       ALLERGIES  Allergies   Allergen Reactions   • Fish Allergy      CRAWFISH   • Nkda [No Known Drug Allergy]    • Other Environmental      Environment, \"everything that grows\"   • Other Food      Sea food   • Peanut-Derived        PHYSICAL EXAM  VITAL SIGNS: /80   Pulse 75   Temp 36.5 °C (97.7 °F) (Temporal)   Resp 16   Ht 1.88 m (6' 2\")   Wt 71.6 kg (157 lb 13.6 oz)   SpO2 98%   BMI 20.27 kg/m²    Constitutional: Awake and alert  HENT: Normal inspection  Eyes: Normal inspection  Neck: Grossly normal range of motion.  Cardiovascular: Normal heart rate  Thorax & Lungs: No respiratory distress  Skin: Mild skin irritation over the fourth proximal dorsal phalanx.  No laceration or " abrasion  Extremities: Diffuse tenderness of the left wrist no tenderness more proximally over the forearm or elbow.  No tenderness of metacarpals or phalanges  Neurologic: Grossly normal   Psychiatric: Normal for situation    RADIOLOGY/PROCEDURES  DX-WRIST-COMPLETE 3+ LEFT   Final Result         1.  No acute traumatic bony injury.           Imaging is interpreted by radiologist      COURSE & MEDICAL DECISION MAKING  Patient presents with left wrist pain after trauma.  Obtained x-ray.  X-ray is negative.  Suspect sprain however cannot rule out occult fracture.  He will be placed in a Velcro wrist splint.  he needs reevaluation with orthopedics and was referred to Dr. Castellon.  Advised Tylenol and/or ibuprofen as needed.    FINAL IMPRESSION  1.  Left wrist sprain, rule out occult fracture      This dictation was created using voice recognition software. The accuracy of the dictation is limited to the abilities of the software.  The nursing notes were reviewed and certain aspects of this information were incorporated into this note.      Electronically signed by: Julio Doyle M.D., 4/13/2021 8:09 AM

## 2021-04-13 NOTE — ED NOTES
Splint placed. Reviewed discharge instructions with pt. Verbalized understanding. Pt ambulatory out of ER with steady gait.

## 2021-04-13 NOTE — ED TRIAGE NOTES
Oz Issac Jared  20 y.o. male  Chief Complaint   Patient presents with   • Wrist Pain     Patient reports he was doing yard work yesterday planting a tree when it fell onto his left wrist. Circulation and sensation intact, mobility limited due to pain.

## 2022-04-21 ENCOUNTER — OFFICE VISIT (OUTPATIENT)
Dept: URGENT CARE | Facility: CLINIC | Age: 22
End: 2022-04-21
Payer: MEDICAID

## 2022-04-21 VITALS
HEART RATE: 98 BPM | BODY MASS INDEX: 19.12 KG/M2 | HEIGHT: 74 IN | TEMPERATURE: 99 F | OXYGEN SATURATION: 97 % | RESPIRATION RATE: 24 BRPM | WEIGHT: 149 LBS | DIASTOLIC BLOOD PRESSURE: 82 MMHG | SYSTOLIC BLOOD PRESSURE: 122 MMHG

## 2022-04-21 DIAGNOSIS — S39.012A STRAIN OF LUMBAR REGION, INITIAL ENCOUNTER: ICD-10-CM

## 2022-04-21 PROCEDURE — 99203 OFFICE O/P NEW LOW 30 MIN: CPT | Performed by: NURSE PRACTITIONER

## 2022-04-21 RX ORDER — CYCLOBENZAPRINE HCL 5 MG
5 TABLET ORAL 3 TIMES DAILY PRN
Qty: 30 TABLET | Refills: 0 | Status: SHIPPED | OUTPATIENT
Start: 2022-04-21

## 2022-04-21 RX ORDER — KETOROLAC TROMETHAMINE 30 MG/ML
15 INJECTION, SOLUTION INTRAMUSCULAR; INTRAVENOUS ONCE
Status: COMPLETED | OUTPATIENT
Start: 2022-04-21 | End: 2022-04-21

## 2022-04-21 RX ADMIN — KETOROLAC TROMETHAMINE 15 MG: 30 INJECTION, SOLUTION INTRAMUSCULAR; INTRAVENOUS at 10:12

## 2022-04-21 ASSESSMENT — ENCOUNTER SYMPTOMS
WEIGHT LOSS: 0
SORE THROAT: 0
SHORTNESS OF BREATH: 0
MYALGIAS: 0
BOWEL INCONTINENCE: 0
DIZZINESS: 0
PARESTHESIAS: 0
VOMITING: 0
CHILLS: 0
LEG PAIN: 0
EYE PAIN: 0
HEADACHES: 0
BACK PAIN: 1
NAUSEA: 0
PERIANAL NUMBNESS: 0
PARESIS: 0
FEVER: 0

## 2022-04-21 NOTE — LETTER
April 21, 2022         Patient: Oz Coleman   YOB: 2000   Date of Visit: 4/21/2022           To Whom it May Concern:    Oz Coleman was seen in my clinic on 4/21/2022. He may return to work on 4/22/22.    If you have any questions or concerns, please don't hesitate to call.        Sincerely,           NATALI Drake.  Electronically Signed

## 2022-04-21 NOTE — PROGRESS NOTES
Subjective:   Oz Coleman is a 21 y.o. male who presents for Low Back Pain (Previous fracture 3+ years ago, low back pain now, unknown injury)      Back Pain  This is a new problem. Episode onset: 2weeks felt slight pain after yard work, worse this morning. The problem occurs constantly. The problem has been gradually worsening since onset. The pain is present in the lumbar spine. The quality of the pain is described as aching. The pain does not radiate. The pain is at a severity of 9/10. The pain is moderate. The pain is the same all the time. The symptoms are aggravated by twisting, standing, position and bending. Stiffness is present all day. Pertinent negatives include no bladder incontinence, bowel incontinence, chest pain, fever, headaches, leg pain, paresis, paresthesias, pelvic pain, perianal numbness or weight loss. He has tried nothing for the symptoms. The treatment provided no relief.       Review of Systems   Constitutional: Negative for chills, fever and weight loss.   HENT: Negative for sore throat.    Eyes: Negative for pain.   Respiratory: Negative for shortness of breath.    Cardiovascular: Negative for chest pain.   Gastrointestinal: Negative for bowel incontinence, nausea and vomiting.   Genitourinary: Negative for bladder incontinence, hematuria and pelvic pain.   Musculoskeletal: Positive for back pain. Negative for myalgias.   Skin: Negative for rash.   Neurological: Negative for dizziness, headaches and paresthesias.       Medications:    • cyclobenzaprine    Allergies: Fish allergy, Nkda [no known drug allergy], Other environmental, Other food, and Peanut-derived    Problem List: Oz Coleman does not have a problem list on file.    Surgical History:  Past Surgical History:   Procedure Laterality Date   • OTHER ORTHOPEDIC SURGERY  2003    broken arm       Past Social Hx: Oz Coleman  reports that he has been smoking cigarettes. He has been smoking about 1.00 pack  "per day. He has never used smokeless tobacco. He reports current alcohol use. He reports previous drug use. Drug: Inhaled.     Past Family Hx:  Oz Coleman family history is not on file.     Problem list, medications, and allergies reviewed by myself today in Epic.     Objective:     /82 (BP Location: Left arm, Patient Position: Sitting, BP Cuff Size: Adult long)   Pulse 98   Temp 37.2 °C (99 °F) (Temporal)   Resp (!) 24   Ht 1.88 m (6' 2\")   Wt 67.6 kg (149 lb)   SpO2 97%   BMI 19.13 kg/m²     Physical Exam  Constitutional:       Appearance: Normal appearance. He is not ill-appearing or toxic-appearing.   HENT:      Head: Normocephalic.      Right Ear: External ear normal.      Left Ear: External ear normal.      Nose: Nose normal.      Mouth/Throat:      Lips: Pink.      Mouth: Mucous membranes are moist.   Eyes:      General: Lids are normal.         Right eye: No discharge.         Left eye: No discharge.   Pulmonary:      Effort: Pulmonary effort is normal. No accessory muscle usage or respiratory distress.   Musculoskeletal:      Cervical back: Normal and full passive range of motion without pain.      Thoracic back: Normal.      Lumbar back: Spasms and tenderness present. No deformity or bony tenderness. Decreased range of motion. Negative right straight leg raise test and negative left straight leg raise test.        Back:       Right knee: Normal.      Left knee: Normal.   Skin:     Coloration: Skin is not pale.   Neurological:      Mental Status: He is alert and oriented to person, place, and time.      Gait: Gait is intact.      Deep Tendon Reflexes:      Reflex Scores:       Patellar reflexes are 2+ on the right side and 2+ on the left side.  Psychiatric:         Mood and Affect: Mood normal.         Thought Content: Thought content normal.         Assessment/Plan:     Diagnosis and associated orders:     1. Strain of lumbar region, initial encounter  ketorolac (TORADOL) injection " 15 mg    cyclobenzaprine (FLEXERIL) 5 mg tablet      Comments/MDM:     Avoid bending, stooping, heavy or repetitive lifting until symptoms resolve.  Avoid prolonged sitting; frequent changes of position may be helpful.  Begin gentle stretching before activity when tolerated. Pt instructed to take Flexeril only while not at work or while driving. PT instructed not to drive or operate heavy machinery while taking this medication as it causes drowsiness.  PT also instructed not to drink alcohol while taking this medication because it contains benzodiazepines.  PT verbalized understanding of these instructions.   Differential diagnosis, natural history, supportive care, and indications for immediate follow-up discussed.                Please note that this dictation was created using voice recognition software. I have made a reasonable attempt to correct obvious errors, but I expect that there are errors of grammar and possibly content that I did not discover before finalizing the note.    This note was electronically signed by José CEBALLOS.

## 2024-02-26 ENCOUNTER — APPOINTMENT (OUTPATIENT)
Dept: RADIOLOGY | Facility: IMAGING CENTER | Age: 24
End: 2024-02-26
Attending: NURSE PRACTITIONER
Payer: MEDICAID

## 2024-02-26 ENCOUNTER — OFFICE VISIT (OUTPATIENT)
Dept: URGENT CARE | Facility: CLINIC | Age: 24
End: 2024-02-26
Payer: MEDICAID

## 2024-02-26 VITALS
BODY MASS INDEX: 21.09 KG/M2 | WEIGHT: 164.3 LBS | OXYGEN SATURATION: 98 % | RESPIRATION RATE: 18 BRPM | HEART RATE: 80 BPM | TEMPERATURE: 98.9 F | DIASTOLIC BLOOD PRESSURE: 70 MMHG | HEIGHT: 74 IN | SYSTOLIC BLOOD PRESSURE: 116 MMHG

## 2024-02-26 DIAGNOSIS — S89.91XA INJURY OF RIGHT KNEE, INITIAL ENCOUNTER: ICD-10-CM

## 2024-02-26 PROCEDURE — 73564 X-RAY EXAM KNEE 4 OR MORE: CPT | Mod: TC,RT | Performed by: RADIOLOGY

## 2024-02-26 PROCEDURE — 3074F SYST BP LT 130 MM HG: CPT | Performed by: NURSE PRACTITIONER

## 2024-02-26 PROCEDURE — 99213 OFFICE O/P EST LOW 20 MIN: CPT | Performed by: NURSE PRACTITIONER

## 2024-02-26 PROCEDURE — 3078F DIAST BP <80 MM HG: CPT | Performed by: NURSE PRACTITIONER

## 2024-02-26 NOTE — LETTER
February 26, 2024    To Whom It May Concern:         This is confirmation that Oz Coleman attended his scheduled appointment with CECELIA Anderson on 2/26/24. Please excuse from work 2/26/24. May return 2/27/24.         Sincerely,          NATALI Anderson.  [phone]

## 2024-02-26 NOTE — PROGRESS NOTES
Date: 02/26/24        Chief Complaint   Patient presents with    Knee Pain     Broke it a couple of years ago hit knee this weekend and is hurting         History of Present Illness: 23 y.o.  male presents to clinic with right knee pain.  Patient states 2 days ago he was playing with his daughter and hit the front of his knee on his couch.  He states he immediately had pain after injury.  He states this pain and the injury do feel similar to his previous patella fracture.  He reports that he fractured his patella approximately 3 years ago.  Currently has pain and swelling to his right knee he is able to ambulate with only a mild limp.  He denies any distal numbness or tingling.      ROS:    No severe shortness of breath   No Cardiac like chest pain, as discussed   As otherwise stated in HPI    Medical/SX/ Social History:  Reviewed per chart    Pertinent Medications:    Current Outpatient Medications on File Prior to Visit   Medication Sig Dispense Refill    cyclobenzaprine (FLEXERIL) 5 mg tablet Take 1 Tablet by mouth 3 times a day as needed. 30 Tablet 0     No current facility-administered medications on file prior to visit.        Allergies:    Fish allergy, Nkda [no known drug allergy], Other environmental, Other food, and Peanut-derived     Problem list, medications, and allergies reviewed by myself today in Epic     Physical Exam:    Vitals:    02/26/24 0824   BP: 116/70   Pulse: 80   Resp: 18   Temp: 37.2 °C (98.9 °F)   SpO2: 98%             Physical Exam  Constitutional:       Appearance: Normal appearance.   HENT:      Head: Normocephalic and atraumatic.   Musculoskeletal:      Right knee: Swelling present. Decreased range of motion. Tenderness present.      Comments: Range of motion is intact although painful.  Patellar is in the correct position.  Distal neurovascular status is grossly intact.   Neurological:      Mental Status: He is alert.          Diagnostics:      DX-KNEE COMPLETE 4+ RIGHT    Result  Date: 2/26/2024 2/26/2024 8:30 AM HISTORY/REASON FOR EXAM:  Pain/Deformity Following Trauma; previous patella fx, reinjury TECHNIQUE/EXAM DESCRIPTION AND NUMBER OF VIEWS: 4 of the right knee COMPARISON: Right knee series 2/16/2021. FINDINGS: There is an old healed right patellar fracture. There is normal bony mineralization.  There is no evidence of acute fracture, dislocation, or osseous lesion.  There is no evidence of soft tissue injury.     1.  No radiographic evidence of acute injury.      Diagnostics interpreted by myself, confirmed by radiology     Medical Decision making and plan :  I personally reviewed prior external notes and test results pertinent to today's visit. Pt is clinically stable at today's acute urgent care visit.  Patient appears nontoxic with no acute distress noted. Appropriate for outpatient care at this time.      Pleasant 23 y.o. male presented clinic with an injury to his right knee.  Previous patellar fracture 3 years ago.  Due to this did obtain a x-ray of the knee fortunately is negative no signs of refracture.  Advised rest ice elevation.  Did offer in clinic knee brace patient declined stating he does have 1 at home.  Placed a referral to orthopedics for follow-up.    1. Injury of right knee, initial encounter    - DX-KNEE COMPLETE 4+ RIGHT; Future  - Referral to Orthopedics          Shared decision-making was utilized with patient for treatment plan. Medication discussed included indication for use and the potential benefits and side effects. Education was provided regarding the aforementioned assessments.  Differential Diagnosis, natural history, and supportive care discussed. All of the patient's questions were answered to their satisfaction at the time of discharge. Patient was encouraged to monitor symptoms closely. Those signs and symptoms which would warrant concern and mandate seeking a higher level of service through the emergency department discussed at length.  Patient  stated agreement and understanding of this plan of care.    Disposition:  Home in stable condition       Voice Recognition Disclaimer:  Portions of this document were created using voice recognition software. The software does have a chance of producing errors of grammar and possibly content. I have made every reasonable attempt to correct obvious errors, but there may be errors of grammar and possibly content that I did not discover before finalizing the documentation.    NATALI Anderson.

## 2025-05-31 NOTE — ED NOTES
Patient understands discharge instructions. Given all DC education, prescriptions, f/u appointments. Pt verbalized understanding.  In no distress. IV and telemetry dc'd. Has all belongings.  Ambulating well with steady gait. Will return for worsening symptoms.    
Pt able to ambulate from triage to [id number] w visitor. Pt c/o pain 10/10 when walking, 5/10 when sitting down, knee unwrapped from ace wrap upon lying in bed, pulses intact to RLE. Right knee does not appear swollen, pain radiates from knee cap down right side of lateral leg for about 2 inches.   
Normal for race